# Patient Record
Sex: MALE | Race: WHITE | NOT HISPANIC OR LATINO | URBAN - METROPOLITAN AREA
[De-identification: names, ages, dates, MRNs, and addresses within clinical notes are randomized per-mention and may not be internally consistent; named-entity substitution may affect disease eponyms.]

---

## 2018-10-01 NOTE — PATIENT DISCUSSION
VITREOMACULAR TRACTION: OU; NOT VISUALLY SIGNIFICANT. PT EDUCATION. CONTINUE TO MONITOR FOR NOW. RETURN FOR FOLLOW-UPS AS SCHEDULED.

## 2020-03-09 NOTE — PATIENT DISCUSSION
CATARACTS, OU - VISUALLY SIGNIFICANT. PT TO CALL WHEN READY TO PROCEED WITH SURGERY. SCHEDULE OD  FIRST THEN LATER IN OS  IF VISUAL SYMPTOMS PERSIST.

## 2020-05-20 NOTE — PATIENT DISCUSSION
Surgery Counseling: I have discussed the option of scheduling surgery versus following, as well as the risks, benefits and alternatives of cataract surgery with the patient. It was explained that the surgery is medically indicated at this time, and it can be performed at the patient's option as delaying will cause no further deterioration, therefore there is no rush and there is no harm in waiting to have surgery. It was also explained that there is no guarantee that removing the cataract will improve their vision. The patient understands and desires to proceed with cataract surgery with the implantation of an intraocular lens to improve vision for __READING SMALL PRINT____. I have given the patient the prescribed regimen of the all-in-one drop to use before and after cataract surgery. They have elected to use the all-in-one option of Pred/Gati/Brom(prednisolone acetate,gatifloxacin,and bromfenac. Patient to administer as directed.

## 2020-05-20 NOTE — PATIENT DISCUSSION
CATARACTS, OU - VISUALLY SIGNIFICANT. SCHEDULE _OD_ FIRST THEN LATER IN OS__ DISCUSSED OPTION OF __STANDARD OU. PATIENT UNDERSTANDS AND DESIRES _______STANDARD OU.

## 2020-07-06 NOTE — PATIENT DISCUSSION
Pre-Op 2nd Eye Counseling: The patient has noticed an improvement in their visual symptoms in the operative eye. The patient complains of decreased vision in the fellow eye when _____reading_____. It was explained to the patient that the decision to proceed with cataract surgery in the fellow eye is entirely a separate decision from the surgical eye. All of the same risks, benefits and alternatives are reviewed with the patient again. The patient does feel the vision in the non-operative eye is limiting their daily activities and elects to proceed with cataract surgery in the __left_____ eye. . I have given the patient the prescribed regimen of  drops to use before and after cataract surgery. Patient to administer as directed.

## 2020-07-06 NOTE — PATIENT DISCUSSION
S/P PE IOL, _od__. DOING WELL. CONTINUE PRED-GATI-BROM IN THE SURGICAL EYE  FOR A TOTAL OF 3 WEEKS USE THEN DISCONTINUE. PATIENT DESIRES __standard_____IOL FOR 2ND EYE. SCHEDULE CATARACT SURGERY.

## 2021-01-12 NOTE — PATIENT DISCUSSION
S/P PE IOL, OU_. DOING WELL. SPECS RX OFFERED. RX ARC IN SPECS TO MINIMIZE GLARE. RETURN FOR FOLLOW-UP AS SCHEDULED.

## 2022-03-07 NOTE — PATIENT DISCUSSION
The patient has blepharitis. Blepharitis is a common chronic inflammation of the eyelids. I instructed the patient to apply warm compresses and to scrub the base of the eyelashes daily with a commercially available eyelid cleaning pad or diluted baby shampoo as described in the direction sheet given to the patient. This treatment may need to be continued indefinitely. In addition, I recommended a topical antibiotic/steroid ointment to be applied to the eyelids for the next several days. I emphasized the risks of this including infection, glaucoma, and cataracts and the need to stop this medicine as directed. DISPLAY PLAN FREE TEXT

## 2022-03-08 ENCOUNTER — ESTABLISHED PATIENT (OUTPATIENT)
Dept: URBAN - METROPOLITAN AREA CLINIC 21 | Facility: CLINIC | Age: 77
End: 2022-03-08

## 2022-03-08 DIAGNOSIS — H02.88A: ICD-10-CM

## 2022-03-08 DIAGNOSIS — D31.32: ICD-10-CM

## 2022-03-08 DIAGNOSIS — H02.88B: ICD-10-CM

## 2022-03-08 DIAGNOSIS — H40.1421: ICD-10-CM

## 2022-03-08 PROCEDURE — 92014 COMPRE OPH EXAM EST PT 1/>: CPT

## 2022-03-08 PROCEDURE — 76514 ECHO EXAM OF EYE THICKNESS: CPT

## 2022-03-08 ASSESSMENT — TONOMETRY
OD_IOP_MMHG: 15
OS_IOP_MMHG: 17

## 2022-03-08 ASSESSMENT — PACHYMETRY
OD_CT_UM: 612
OS_CT_UM: 591

## 2022-03-08 ASSESSMENT — VISUAL ACUITY
OS_SC: 20/40
OD_SC: 20/25-2

## 2022-06-30 ENCOUNTER — ESTABLISHED PATIENT (OUTPATIENT)
Dept: URBAN - METROPOLITAN AREA CLINIC 21 | Facility: CLINIC | Age: 77
End: 2022-06-30

## 2022-06-30 DIAGNOSIS — H00.011: ICD-10-CM

## 2022-06-30 PROCEDURE — 99213 OFFICE O/P EST LOW 20 MIN: CPT

## 2022-06-30 PROCEDURE — 92285 EXTERNAL OCULAR PHOTOGRAPHY: CPT

## 2022-06-30 RX ORDER — TOBRAMYCIN AND DEXAMETHASONE 3; 1 MG/G; MG/G: OINTMENT OPHTHALMIC

## 2022-06-30 ASSESSMENT — VISUAL ACUITY
OD_SC: 20/40
OS_SC: 20/50-1
OU_SC: J1

## 2022-06-30 ASSESSMENT — TONOMETRY
OD_IOP_MMHG: 14
OS_IOP_MMHG: 15

## 2022-06-30 NOTE — PATIENT DISCUSSION
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 mild assoc redness/edema of surrounding lid. no other complications at this time.

## 2022-07-06 ENCOUNTER — FOLLOW UP (OUTPATIENT)
Dept: URBAN - METROPOLITAN AREA CLINIC 21 | Facility: CLINIC | Age: 77
End: 2022-07-06

## 2022-07-06 DIAGNOSIS — H00.011: ICD-10-CM

## 2022-07-06 PROCEDURE — 99213 OFFICE O/P EST LOW 20 MIN: CPT

## 2022-07-06 ASSESSMENT — TONOMETRY
OS_IOP_MMHG: 16
OD_IOP_MMHG: 16

## 2022-07-06 ASSESSMENT — VISUAL ACUITY
OD_SC: 20/40
OS_SC: 20/40

## 2022-12-09 ENCOUNTER — TECH ONLY (OUTPATIENT)
Dept: URBAN - METROPOLITAN AREA CLINIC 21 | Facility: CLINIC | Age: 77
End: 2022-12-09

## 2022-12-09 PROCEDURE — 92083 EXTENDED VISUAL FIELD XM: CPT

## 2022-12-09 PROCEDURE — 99211T TECH SERVICE

## 2022-12-09 NOTE — PATIENT DISCUSSION
Questionable change on VF OS with possible new inferior arcuate defect, recommend new N OCT today to confirm possible change.

## 2022-12-09 NOTE — PATIENT DISCUSSION
cont tobradex oph maik TID RUL for 3 more days and then d/c, cont hot compresses TID RUL, for 2 more weeks; if chalazion persists, call to set up drainage consult w/ Steffen/Ángela.

## 2022-12-09 NOTE — PATIENT DISCUSSION
prev noted ignacia and lid redness/edema nearly completely resolved, mod sized chalazion may be forming in its place.

## 2023-01-03 ENCOUNTER — FOLLOW UP (OUTPATIENT)
Dept: URBAN - METROPOLITAN AREA CLINIC 21 | Facility: CLINIC | Age: 78
End: 2023-01-03

## 2023-01-03 DIAGNOSIS — H40.1421: ICD-10-CM

## 2023-01-03 PROCEDURE — 92012 INTRM OPH EXAM EST PATIENT: CPT

## 2023-01-03 PROCEDURE — 92133 CPTRZD OPH DX IMG PST SGM ON: CPT

## 2023-01-03 ASSESSMENT — VISUAL ACUITY
OU_SC: 20/30
OD_SC: 20/40+2
OS_SC: 20/50

## 2023-01-03 ASSESSMENT — TONOMETRY
OD_IOP_MMHG: 10
OS_IOP_MMHG: 20

## 2023-01-03 NOTE — PATIENT DISCUSSION
Discussed importance of using drops as directed, non compliance likely cause of elevated IOP OS today per patient history of non compliance.

## 2023-01-03 NOTE — PATIENT DISCUSSION
Questionable change on VF OS with possible new inferior arcuate defect, recommend new N OCT today to confirm possible change. N OCT IS STABLE TODAY . VF likely rim artifact OS, recommend repeat in 6 months.

## 2023-08-02 ENCOUNTER — FOLLOW UP (OUTPATIENT)
Dept: URBAN - METROPOLITAN AREA CLINIC 21 | Facility: CLINIC | Age: 78
End: 2023-08-02

## 2023-08-02 DIAGNOSIS — H40.1421: ICD-10-CM

## 2023-08-02 PROCEDURE — 99211T TECH SERVICE

## 2023-08-02 PROCEDURE — 92083 EXTENDED VISUAL FIELD XM: CPT

## 2023-08-15 ENCOUNTER — FOLLOW UP (OUTPATIENT)
Dept: URBAN - METROPOLITAN AREA CLINIC 21 | Facility: CLINIC | Age: 78
End: 2023-08-15

## 2023-08-15 DIAGNOSIS — H40.1421: ICD-10-CM

## 2023-08-15 PROCEDURE — 92012 INTRM OPH EXAM EST PATIENT: CPT

## 2023-08-15 ASSESSMENT — TONOMETRY
OD_IOP_MMHG: 15
OS_IOP_MMHG: 12

## 2023-08-15 ASSESSMENT — VISUAL ACUITY
OD_SC: 20/25
OS_SC: 20/40

## 2024-02-20 ENCOUNTER — ESTABLISHED PATIENT (OUTPATIENT)
Dept: URBAN - METROPOLITAN AREA CLINIC 21 | Facility: CLINIC | Age: 79
End: 2024-02-20

## 2024-02-20 DIAGNOSIS — H40.1421: ICD-10-CM

## 2024-02-20 PROCEDURE — 99214 OFFICE O/P EST MOD 30 MIN: CPT

## 2024-02-20 ASSESSMENT — TONOMETRY
OS_IOP_MMHG: 19
OD_IOP_MMHG: 11

## 2024-02-20 ASSESSMENT — VISUAL ACUITY
OS_SC: 20/40-2
OD_SC: 20/40-2

## 2024-09-04 ENCOUNTER — TECH ONLY (OUTPATIENT)
Dept: URBAN - METROPOLITAN AREA CLINIC 21 | Facility: CLINIC | Age: 79
End: 2024-09-04

## 2024-09-04 DIAGNOSIS — H40.1421: ICD-10-CM

## 2024-09-04 PROCEDURE — 92133 CPTRZD OPH DX IMG PST SGM ON: CPT

## 2024-09-04 PROCEDURE — 99211T TECH SERVICE

## 2024-09-04 PROCEDURE — 92083 EXTENDED VISUAL FIELD XM: CPT

## 2024-09-17 ENCOUNTER — FOLLOW UP (OUTPATIENT)
Dept: URBAN - METROPOLITAN AREA CLINIC 21 | Facility: CLINIC | Age: 79
End: 2024-09-17

## 2024-09-17 DIAGNOSIS — H16.223: ICD-10-CM

## 2024-09-17 DIAGNOSIS — H40.1421: ICD-10-CM

## 2024-09-17 DIAGNOSIS — H02.88A: ICD-10-CM

## 2024-09-17 DIAGNOSIS — H02.88B: ICD-10-CM

## 2024-09-17 PROCEDURE — 92012 INTRM OPH EXAM EST PATIENT: CPT

## 2024-11-26 ENCOUNTER — FOLLOW UP (OUTPATIENT)
Dept: URBAN - METROPOLITAN AREA CLINIC 21 | Facility: CLINIC | Age: 79
End: 2024-11-26

## 2024-11-26 DIAGNOSIS — H16.223: ICD-10-CM

## 2024-11-26 DIAGNOSIS — H02.88A: ICD-10-CM

## 2024-11-26 DIAGNOSIS — H40.1421: ICD-10-CM

## 2024-11-26 DIAGNOSIS — H02.88B: ICD-10-CM

## 2024-11-26 DIAGNOSIS — H35.363: ICD-10-CM

## 2024-11-26 DIAGNOSIS — Z96.1: ICD-10-CM

## 2024-11-26 PROCEDURE — 92012 INTRM OPH EXAM EST PATIENT: CPT

## 2024-11-26 PROCEDURE — 92134 CPTRZ OPH DX IMG PST SGM RTA: CPT

## 2024-12-02 ENCOUNTER — FOLLOW UP (OUTPATIENT)
Age: 79
End: 2024-12-02

## 2024-12-02 DIAGNOSIS — H52.03: ICD-10-CM

## 2024-12-02 DIAGNOSIS — H40.1421: ICD-10-CM

## 2024-12-02 DIAGNOSIS — Z96.1: ICD-10-CM

## 2024-12-02 PROCEDURE — 99214 OFFICE O/P EST MOD 30 MIN: CPT

## 2024-12-02 PROCEDURE — 92015 DETERMINE REFRACTIVE STATE: CPT

## 2024-12-19 ENCOUNTER — FOLLOW UP (OUTPATIENT)
Age: 79
End: 2024-12-19

## 2024-12-19 DIAGNOSIS — H52.03: ICD-10-CM

## 2024-12-19 DIAGNOSIS — H52.4: ICD-10-CM

## 2024-12-19 DIAGNOSIS — H52.203: ICD-10-CM

## 2024-12-19 PROCEDURE — 99211NC NO CHARGE VISIT: Mod: NC

## 2025-01-20 ENCOUNTER — COMPREHENSIVE EXAM (OUTPATIENT)
Age: 80
End: 2025-01-20

## 2025-01-20 DIAGNOSIS — H40.1421: ICD-10-CM

## 2025-01-20 DIAGNOSIS — Z96.1: ICD-10-CM

## 2025-01-20 PROCEDURE — 99213 OFFICE O/P EST LOW 20 MIN: CPT

## 2025-03-21 NOTE — PATIENT DISCUSSION
Diabetes without Retinopathy Counseling :  I have discussed with the patient the importance of controlling blood glucose, blood pressure and lipid levels levels  to minimize the risk of developing retinal disease from diabetes. Explained the importance of annual dilated eye exams because effective treatment for diabetic retinopathy depends on timely intervention. The patient was instructed to call or return sooner if they noticed blurred or distorted vision, fluctuating vision, pain or redness around one or both eyes. Return for follow-up as scheduled. dry, intact, no bleeding and no hematoma.

## 2025-03-25 ENCOUNTER — COMPREHENSIVE EXAM (OUTPATIENT)
Age: 80
End: 2025-03-25

## 2025-03-25 DIAGNOSIS — H35.363: ICD-10-CM

## 2025-03-25 DIAGNOSIS — H40.1421: ICD-10-CM

## 2025-03-25 DIAGNOSIS — Z96.1: ICD-10-CM

## 2025-03-25 PROCEDURE — 92014 COMPRE OPH EXAM EST PT 1/>: CPT

## 2025-03-25 PROCEDURE — 92134 CPTRZ OPH DX IMG PST SGM RTA: CPT
